# Patient Record
Sex: FEMALE | Race: WHITE | NOT HISPANIC OR LATINO
[De-identification: names, ages, dates, MRNs, and addresses within clinical notes are randomized per-mention and may not be internally consistent; named-entity substitution may affect disease eponyms.]

---

## 2021-07-08 ENCOUNTER — RESULT REVIEW (OUTPATIENT)
Age: 31
End: 2021-07-08

## 2022-02-24 PROBLEM — Z00.00 ENCOUNTER FOR PREVENTIVE HEALTH EXAMINATION: Status: ACTIVE | Noted: 2022-02-24

## 2022-02-28 ENCOUNTER — ASOB RESULT (OUTPATIENT)
Age: 32
End: 2022-02-28

## 2022-02-28 ENCOUNTER — APPOINTMENT (OUTPATIENT)
Dept: ANTEPARTUM | Facility: CLINIC | Age: 32
End: 2022-02-28
Payer: COMMERCIAL

## 2022-02-28 PROCEDURE — 76811 OB US DETAILED SNGL FETUS: CPT

## 2022-04-08 ENCOUNTER — ASOB RESULT (OUTPATIENT)
Age: 32
End: 2022-04-08

## 2022-04-08 ENCOUNTER — APPOINTMENT (OUTPATIENT)
Dept: MATERNAL FETAL MEDICINE | Facility: CLINIC | Age: 32
End: 2022-04-08
Payer: COMMERCIAL

## 2022-04-08 DIAGNOSIS — O24.419 GESTATIONAL DIABETES MELLITUS IN PREGNANCY, UNSPECIFIED CONTROL: ICD-10-CM

## 2022-04-08 PROCEDURE — G0108 DIAB MANAGE TRN  PER INDIV: CPT | Mod: 95

## 2022-04-08 PROCEDURE — G0109 DIAB MANAGE TRN IND/GROUP: CPT | Mod: 95

## 2022-04-11 RX ORDER — BLOOD-GLUCOSE METER
KIT MISCELLANEOUS 4 TIMES DAILY
Qty: 4 | Refills: 2 | Status: ACTIVE | COMMUNITY
Start: 2022-04-08 | End: 1900-01-01

## 2022-04-11 RX ORDER — LANCETS 33 GAUGE
EACH MISCELLANEOUS
Qty: 4 | Refills: 2 | Status: ACTIVE | COMMUNITY
Start: 2022-04-08 | End: 1900-01-01

## 2022-04-11 RX ORDER — URINE ACETONE TEST STRIPS
STRIP MISCELLANEOUS
Qty: 50 | Refills: 2 | Status: ACTIVE | COMMUNITY
Start: 2022-04-08 | End: 1900-01-01

## 2022-04-11 RX ORDER — BLOOD-GLUCOSE METER
W/DEVICE KIT MISCELLANEOUS
Qty: 1 | Refills: 0 | Status: ACTIVE | COMMUNITY
Start: 2022-04-08 | End: 1900-01-01

## 2022-04-15 ENCOUNTER — NON-APPOINTMENT (OUTPATIENT)
Age: 32
End: 2022-04-15

## 2022-04-18 ENCOUNTER — APPOINTMENT (OUTPATIENT)
Dept: MATERNAL FETAL MEDICINE | Facility: CLINIC | Age: 32
End: 2022-04-18
Payer: COMMERCIAL

## 2022-04-18 ENCOUNTER — ASOB RESULT (OUTPATIENT)
Age: 32
End: 2022-04-18

## 2022-04-18 PROCEDURE — G0108 DIAB MANAGE TRN  PER INDIV: CPT | Mod: 95

## 2022-05-09 ENCOUNTER — APPOINTMENT (OUTPATIENT)
Dept: MATERNAL FETAL MEDICINE | Facility: CLINIC | Age: 32
End: 2022-05-09
Payer: COMMERCIAL

## 2022-05-09 ENCOUNTER — ASOB RESULT (OUTPATIENT)
Age: 32
End: 2022-05-09

## 2022-05-09 PROCEDURE — G0108 DIAB MANAGE TRN  PER INDIV: CPT | Mod: 95

## 2022-06-01 ENCOUNTER — ASOB RESULT (OUTPATIENT)
Age: 32
End: 2022-06-01

## 2022-06-01 ENCOUNTER — APPOINTMENT (OUTPATIENT)
Dept: MATERNAL FETAL MEDICINE | Facility: CLINIC | Age: 32
End: 2022-06-01
Payer: COMMERCIAL

## 2022-06-01 PROCEDURE — G0108 DIAB MANAGE TRN  PER INDIV: CPT | Mod: 95

## 2022-06-29 ENCOUNTER — ASOB RESULT (OUTPATIENT)
Age: 32
End: 2022-06-29

## 2022-06-29 ENCOUNTER — APPOINTMENT (OUTPATIENT)
Dept: MATERNAL FETAL MEDICINE | Facility: CLINIC | Age: 32
End: 2022-06-29

## 2022-06-29 PROCEDURE — G0108 DIAB MANAGE TRN  PER INDIV: CPT | Mod: 95

## 2022-07-06 ENCOUNTER — TRANSCRIPTION ENCOUNTER (OUTPATIENT)
Age: 32
End: 2022-07-06

## 2022-07-06 ENCOUNTER — INPATIENT (INPATIENT)
Facility: HOSPITAL | Age: 32
LOS: 1 days | Discharge: HOME | End: 2022-07-08
Attending: OBSTETRICS & GYNECOLOGY | Admitting: OBSTETRICS & GYNECOLOGY
Payer: COMMERCIAL

## 2022-07-06 VITALS — OXYGEN SATURATION: 96 % | HEART RATE: 91 BPM

## 2022-07-06 DIAGNOSIS — Z3A.00 WEEKS OF GESTATION OF PREGNANCY NOT SPECIFIED: ICD-10-CM

## 2022-07-06 DIAGNOSIS — Z34.80 ENCOUNTER FOR SUPERVISION OF OTHER NORMAL PREGNANCY, UNSPECIFIED TRIMESTER: ICD-10-CM

## 2022-07-06 DIAGNOSIS — O26.899 OTHER SPECIFIED PREGNANCY RELATED CONDITIONS, UNSPECIFIED TRIMESTER: ICD-10-CM

## 2022-07-06 LAB
BASOPHILS # BLD AUTO: 0.02 K/UL — SIGNIFICANT CHANGE UP (ref 0–0.2)
BASOPHILS NFR BLD AUTO: 0.2 % — SIGNIFICANT CHANGE UP (ref 0–2)
BLD GP AB SCN SERPL QL: NEGATIVE — SIGNIFICANT CHANGE UP
COVID-19 SPIKE DOMAIN AB INTERP: POSITIVE
COVID-19 SPIKE DOMAIN ANTIBODY RESULT: >250 U/ML — HIGH
EOSINOPHIL # BLD AUTO: 0.06 K/UL — SIGNIFICANT CHANGE UP (ref 0–0.5)
EOSINOPHIL NFR BLD AUTO: 0.5 % — SIGNIFICANT CHANGE UP (ref 0–6)
GLUCOSE BLDC GLUCOMTR-MCNC: 101 MG/DL — HIGH (ref 70–99)
GLUCOSE BLDC GLUCOMTR-MCNC: 85 MG/DL — SIGNIFICANT CHANGE UP (ref 70–99)
HCT VFR BLD CALC: 37.7 % — SIGNIFICANT CHANGE UP (ref 34.5–45)
HGB BLD-MCNC: 12.5 G/DL — SIGNIFICANT CHANGE UP (ref 11.5–15.5)
IMM GRANULOCYTES NFR BLD AUTO: 0.9 % — SIGNIFICANT CHANGE UP (ref 0–1.5)
LYMPHOCYTES # BLD AUTO: 1.91 K/UL — SIGNIFICANT CHANGE UP (ref 1–3.3)
LYMPHOCYTES # BLD AUTO: 16.3 % — SIGNIFICANT CHANGE UP (ref 13–44)
MCHC RBC-ENTMCNC: 28.3 PG — SIGNIFICANT CHANGE UP (ref 27–34)
MCHC RBC-ENTMCNC: 33.2 GM/DL — SIGNIFICANT CHANGE UP (ref 32–36)
MCV RBC AUTO: 85.3 FL — SIGNIFICANT CHANGE UP (ref 80–100)
MONOCYTES # BLD AUTO: 0.61 K/UL — SIGNIFICANT CHANGE UP (ref 0–0.9)
MONOCYTES NFR BLD AUTO: 5.2 % — SIGNIFICANT CHANGE UP (ref 2–14)
NEUTROPHILS # BLD AUTO: 9.01 K/UL — HIGH (ref 1.8–7.4)
NEUTROPHILS NFR BLD AUTO: 76.9 % — SIGNIFICANT CHANGE UP (ref 43–77)
NRBC # BLD: 0 /100 WBCS — SIGNIFICANT CHANGE UP (ref 0–0)
PLATELET # BLD AUTO: 165 K/UL — SIGNIFICANT CHANGE UP (ref 150–400)
RBC # BLD: 4.42 M/UL — SIGNIFICANT CHANGE UP (ref 3.8–5.2)
RBC # FLD: 13.3 % — SIGNIFICANT CHANGE UP (ref 10.3–14.5)
RH IG SCN BLD-IMP: POSITIVE — SIGNIFICANT CHANGE UP
SARS-COV-2 IGG+IGM SERPL QL IA: >250 U/ML — HIGH
SARS-COV-2 IGG+IGM SERPL QL IA: POSITIVE
SARS-COV-2 RNA SPEC QL NAA+PROBE: SIGNIFICANT CHANGE UP
T PALLIDUM AB TITR SER: NEGATIVE — SIGNIFICANT CHANGE UP
WBC # BLD: 11.72 K/UL — HIGH (ref 3.8–10.5)
WBC # FLD AUTO: 11.72 K/UL — HIGH (ref 3.8–10.5)

## 2022-07-06 RX ORDER — DIPHENHYDRAMINE HCL 50 MG
25 CAPSULE ORAL EVERY 6 HOURS
Refills: 0 | Status: DISCONTINUED | OUTPATIENT
Start: 2022-07-06 | End: 2022-07-08

## 2022-07-06 RX ORDER — IBUPROFEN 200 MG
600 TABLET ORAL EVERY 6 HOURS
Refills: 0 | Status: COMPLETED | OUTPATIENT
Start: 2022-07-06 | End: 2023-06-04

## 2022-07-06 RX ORDER — IBUPROFEN 200 MG
600 TABLET ORAL EVERY 6 HOURS
Refills: 0 | Status: DISCONTINUED | OUTPATIENT
Start: 2022-07-06 | End: 2022-07-08

## 2022-07-06 RX ORDER — SIMETHICONE 80 MG/1
80 TABLET, CHEWABLE ORAL EVERY 4 HOURS
Refills: 0 | Status: DISCONTINUED | OUTPATIENT
Start: 2022-07-06 | End: 2022-07-08

## 2022-07-06 RX ORDER — OXYTOCIN 10 UNIT/ML
2 VIAL (ML) INJECTION
Qty: 30 | Refills: 0 | Status: DISCONTINUED | OUTPATIENT
Start: 2022-07-06 | End: 2022-07-08

## 2022-07-06 RX ORDER — LANOLIN
1 OINTMENT (GRAM) TOPICAL EVERY 6 HOURS
Refills: 0 | Status: DISCONTINUED | OUTPATIENT
Start: 2022-07-06 | End: 2022-07-08

## 2022-07-06 RX ORDER — BENZOCAINE 10 %
1 GEL (GRAM) MUCOUS MEMBRANE EVERY 6 HOURS
Refills: 0 | Status: DISCONTINUED | OUTPATIENT
Start: 2022-07-06 | End: 2022-07-08

## 2022-07-06 RX ORDER — CHLORHEXIDINE GLUCONATE 213 G/1000ML
1 SOLUTION TOPICAL ONCE
Refills: 0 | Status: DISCONTINUED | OUTPATIENT
Start: 2022-07-06 | End: 2022-07-06

## 2022-07-06 RX ORDER — KETOROLAC TROMETHAMINE 30 MG/ML
30 SYRINGE (ML) INJECTION ONCE
Refills: 0 | Status: DISCONTINUED | OUTPATIENT
Start: 2022-07-06 | End: 2022-07-06

## 2022-07-06 RX ORDER — SODIUM CHLORIDE 9 MG/ML
3 INJECTION INTRAMUSCULAR; INTRAVENOUS; SUBCUTANEOUS EVERY 8 HOURS
Refills: 0 | Status: DISCONTINUED | OUTPATIENT
Start: 2022-07-06 | End: 2022-07-08

## 2022-07-06 RX ORDER — PRAMOXINE HYDROCHLORIDE 150 MG/15G
1 AEROSOL, FOAM RECTAL EVERY 4 HOURS
Refills: 0 | Status: DISCONTINUED | OUTPATIENT
Start: 2022-07-06 | End: 2022-07-08

## 2022-07-06 RX ORDER — DIBUCAINE 1 %
1 OINTMENT (GRAM) RECTAL EVERY 6 HOURS
Refills: 0 | Status: DISCONTINUED | OUTPATIENT
Start: 2022-07-06 | End: 2022-07-08

## 2022-07-06 RX ORDER — OXYTOCIN 10 UNIT/ML
333.33 VIAL (ML) INJECTION
Qty: 20 | Refills: 0 | Status: COMPLETED | OUTPATIENT
Start: 2022-07-06 | End: 2022-07-06

## 2022-07-06 RX ORDER — TETANUS TOXOID, REDUCED DIPHTHERIA TOXOID AND ACELLULAR PERTUSSIS VACCINE, ADSORBED 5; 2.5; 8; 8; 2.5 [IU]/.5ML; [IU]/.5ML; UG/.5ML; UG/.5ML; UG/.5ML
0.5 SUSPENSION INTRAMUSCULAR ONCE
Refills: 0 | Status: DISCONTINUED | OUTPATIENT
Start: 2022-07-06 | End: 2022-07-08

## 2022-07-06 RX ORDER — OXYCODONE HYDROCHLORIDE 5 MG/1
5 TABLET ORAL
Refills: 0 | Status: DISCONTINUED | OUTPATIENT
Start: 2022-07-06 | End: 2022-07-08

## 2022-07-06 RX ORDER — OXYTOCIN 10 UNIT/ML
333.33 VIAL (ML) INJECTION
Qty: 20 | Refills: 0 | Status: DISCONTINUED | OUTPATIENT
Start: 2022-07-06 | End: 2022-07-08

## 2022-07-06 RX ORDER — ACETAMINOPHEN 500 MG
975 TABLET ORAL
Refills: 0 | Status: DISCONTINUED | OUTPATIENT
Start: 2022-07-06 | End: 2022-07-08

## 2022-07-06 RX ORDER — AER TRAVELER 0.5 G/1
1 SOLUTION RECTAL; TOPICAL EVERY 4 HOURS
Refills: 0 | Status: DISCONTINUED | OUTPATIENT
Start: 2022-07-06 | End: 2022-07-08

## 2022-07-06 RX ORDER — MAGNESIUM HYDROXIDE 400 MG/1
30 TABLET, CHEWABLE ORAL
Refills: 0 | Status: DISCONTINUED | OUTPATIENT
Start: 2022-07-06 | End: 2022-07-08

## 2022-07-06 RX ORDER — SODIUM CHLORIDE 9 MG/ML
1000 INJECTION, SOLUTION INTRAVENOUS
Refills: 0 | Status: DISCONTINUED | OUTPATIENT
Start: 2022-07-06 | End: 2022-07-06

## 2022-07-06 RX ORDER — SODIUM CHLORIDE 9 MG/ML
1000 INJECTION INTRAMUSCULAR; INTRAVENOUS; SUBCUTANEOUS
Refills: 0 | Status: DISCONTINUED | OUTPATIENT
Start: 2022-07-06 | End: 2022-07-06

## 2022-07-06 RX ORDER — CITRIC ACID/SODIUM CITRATE 300-500 MG
15 SOLUTION, ORAL ORAL EVERY 6 HOURS
Refills: 0 | Status: DISCONTINUED | OUTPATIENT
Start: 2022-07-06 | End: 2022-07-06

## 2022-07-06 RX ORDER — HYDROCORTISONE 1 %
1 OINTMENT (GRAM) TOPICAL EVERY 6 HOURS
Refills: 0 | Status: DISCONTINUED | OUTPATIENT
Start: 2022-07-06 | End: 2022-07-08

## 2022-07-06 RX ORDER — OXYCODONE HYDROCHLORIDE 5 MG/1
5 TABLET ORAL ONCE
Refills: 0 | Status: DISCONTINUED | OUTPATIENT
Start: 2022-07-06 | End: 2022-07-08

## 2022-07-06 RX ADMIN — Medication 2 MILLIUNIT(S)/MIN: at 08:00

## 2022-07-06 RX ADMIN — Medication 1000 MILLIUNIT(S)/MIN: at 09:58

## 2022-07-06 RX ADMIN — Medication 30 MILLIGRAM(S): at 12:42

## 2022-07-06 RX ADMIN — Medication 600 MILLIGRAM(S): at 18:33

## 2022-07-06 RX ADMIN — Medication 975 MILLIGRAM(S): at 21:15

## 2022-07-06 RX ADMIN — Medication 600 MILLIGRAM(S): at 19:10

## 2022-07-06 RX ADMIN — Medication 600 MILLIGRAM(S): at 23:42

## 2022-07-06 RX ADMIN — Medication 975 MILLIGRAM(S): at 20:44

## 2022-07-06 RX ADMIN — Medication 1000 MILLIUNIT(S)/MIN: at 11:00

## 2022-07-06 NOTE — DISCHARGE NOTE OB - CARE PROVIDER_API CALL
Pipo Gallardo)  Obstetrics and Gynecology  [address] [facility] [address]  [place] [address]  Phone: [phone]  Fax: [phone]  Follow Up Time:

## 2022-07-06 NOTE — OB PROVIDER LABOR PROGRESS NOTE - NS_SUBJECTIVE/OBJECTIVE_OBGYN_ALL_OB_FT
R2 Labor & Delivery Progress Note     Pt seen & examined at bedside for late deceleration with spontaneous recovery to baseline following repositioning to L lateral side.    T(C): 36.6 (07-06-22 @ 04:38), Max: 36.6 (07-06-22 @ 03:57)  HR: 88 (07-06-22 @ 06:27) (69 - 100)  BP: 95/60 (07-06-22 @ 06:26) (95/60 - 135/92)  RR: 18 (07-06-22 @ 04:38) (17 - 18)  SpO2: 98% (07-06-22 @ 06:27) (92% - 100%)

## 2022-07-06 NOTE — OB RN PATIENT PROFILE - BREAST MILK SUPPORTS STABLE NEWBORN BLOOD SUGAR
Patient was seen 11/27/17 for low back strain and prescribed flexeril #30 x 0.  Pharmacy and RX pending, please sign if approved.     Statement Selected

## 2022-07-06 NOTE — OB RN PATIENT PROFILE - FALL HARM RISK - UNIVERSAL INTERVENTIONS
Bed in lowest position, wheels locked, appropriate side rails in place/Call bell, personal items and telephone in reach/Instruct patient to call for assistance before getting out of bed or chair/Non-slip footwear when patient is out of bed/Agency to call system/Physically safe environment - no spills, clutter or unnecessary equipment/Purposeful Proactive Rounding/Room/bathroom lighting operational, light cord in reach

## 2022-07-06 NOTE — OB RN TRIAGE NOTE - FALL HARM RISK - UNIVERSAL INTERVENTIONS
Bed in lowest position, wheels locked, appropriate side rails in place/Call bell, personal items and telephone in reach/Instruct patient to call for assistance before getting out of bed or chair/Non-slip footwear when patient is out of bed/[place] to call system/Physically safe environment - no spills, clutter or unnecessary equipment/Purposeful Proactive Rounding/Room/bathroom lighting operational, light cord in reach

## 2022-07-06 NOTE — OB PROVIDER H&P - HISTORY OF PRESENT ILLNESS
Admission H&P    Subjective  HPI: 31yoF  gestational age presents for ROM @ 2:30a. +FM. CTXs q4min. -VB. Pt denies any other concerns.    – PNC: GDMA1 GBS-  EFW 2800g by justus.  – OBHx: denies  – GynHx: denies  – PMH: denies  – PSH: denies  – Psych: denies   – Social: denies   – Meds: PNV   – Allergies: NKDA  – Will accept blood transfusions? Yes

## 2022-07-06 NOTE — OB PROVIDER H&P - ASSESSMENT
Assessment  – No prenatal issues. GBS-    Plan  1. Admit to LND. Routine Labs. IVF.  2. IOL w/ oral cytotec  3. Fetus: cat 1 tracing. VTX. EFW 2800g by sono. Continuous EFM. Sono. No concerns.  4. Prenatal issues: GDMA1  5. GBS-  6. Pain: IV pain meds/epidural PRN    Patient discussed with attending physician, Dr. Sami Nino MD PGY1

## 2022-07-06 NOTE — OB PROVIDER LABOR PROGRESS NOTE - ASSESSMENT
Plan: 31y y/o  @ 38w3d in stable condition  - Not yet received PO cytotec  - Improvement in tracing with repositioning  - Continuous EFM, [place]  - Con't IVF    Holly Randhawa MD  PGY-2 Plan: 31y y/o  @ 38w3d in stable condition  - Not yet received PO cytotec  - Improvement in tracing with repositioning  - Continuous EFM, [place]  - Con't IVF    d/w Dr Sami Randhawa MD  PGY-2

## 2022-07-06 NOTE — DISCHARGE NOTE OB - NS AS DC STROKE DX YN
Bedside and Verbal shift change report given to Timothy Cardoso (oncoming nurse) by RAFFAELE Staley RN (offgoing nurse). Report given with SBAR, Kardex, Intake/Output, MAR and Recent Results. no

## 2022-07-06 NOTE — DISCHARGE NOTE OB - NS MD DC FALL RISK RISK
For information on Fall & Injury Prevention, visit: [email or web address] OR  [email or web address] OR  https://www.cdc.gov/steadi/patient.html

## 2022-07-06 NOTE — OB NEONATOLOGY/PEDIATRICIAN DELIVERY SUMMARY - NSPEDSNEONOTESA_OBGYN_ALL_OB_FT
Peds team directed by Dr March, Attending Neonatologist, responded to post delivery Code 100 of a 38.3 wk female infant s/p  w/ CAN x 1.  Mom is 30 yo  O+/PNL (-)/immune/GBS (-) ()/Covid (-).  Unremarkable maternal Hx.  Pregnancy complicated by GDM A1 - diet controlled.  Last FS 85.  SROM @ 0230 - clear fluid reported.  Infant delivered in vertex position w/ CAN x 1.  As per DR JOHNSTON, at delivery there was no spontaneous cry, decreased tone and pallor of infant.  Infant placed on mom's chest x ~ 1 minute, stimulated w/o improvement so Code 100 called and infant placed in warmer.  Peds arrived at ~ 3 minutes of life, infant appeared stunned and pale, decreased tone but cried w/ stim.  Pulse oximeter and temp probe placed.  O2 sats 98% in RA at ~ 5 minutes of life.  Infant placed on CPAP 5/RA due to tachypnea, mild retractions and prolonged capillary refill w/ some improvement noted in respiratory effort and color.  3 vessels in cord.  PE as noted.  Apgars 6/8.  EOS 0.26.  Mom plans to exclusively breastfeed, defers Hep B vaccine, PMD is Dr Doyle.  Infant transferred to the NICU on CPAP 5/RA for further management.

## 2022-07-06 NOTE — DISCHARGE NOTE OB - CARE PLAN
1 Principal Discharge DX:	Spontaneous vaginal delivery  Assessment and plan of treatment:	routine post partum activities;  regular diet;   limited physical and sexual activities for 5-6 weeks;  to office in 5-6 weeks  for 2 hour gtt at 6 weeks   Principal Discharge DX:	Spontaneous vaginal delivery  Assessment and plan of treatment:	routine post partum activities;  regular diet; limited physical and sexual activities for 5-6 weeks;  to office in 5-6 weeks  for 2 hour gtt at 6 weeks

## 2022-07-06 NOTE — OB PROVIDER DELIVERY SUMMARY - NSPROVIDERDELIVERYNOTE_OBGYN_ALL_OB_FT
patient progressed rapidly to fd and had a relatively short second stage.   was performed with epidural anesthesia without episiotomy.  viable female infant was delivered danyel position, can x 1 tight.  cord doubly clamped and cut.  baby then delivered easily.  baby was pale and was not crying much and peds was called.  cord gases were obtained.  placenta delivered spontaneously and uterine exploration was wnl.  bilateral vaginal wall lacerations were noted and Dr Bridges came to retract for the repair which was done uneventfully.  there was a small second degree laceration that was repaired in the usual manner with 00/000 rapide.  All counts were correct after delivery.  Patient tolerated all procedures well.  baby appeared to be stable but was taken to nicu for further care.

## 2022-07-06 NOTE — OB PROVIDER H&P - NSHPPHYSICALEXAM_GEN_ALL_CORE
Objective  – Vital Signs  BP: 128/84  HR: 88  Temp: afebrile  – PE:   CV: RRR  Pulm: breathing comfortably on RA  Abd: gravid, nontender  Extr: moving all extremities with ease   nitrazine positive    – VE: 1/0/-3  – FHT: baseline 145, mod variability, +accels, -decels  – Western: 2qmin  – EFW: 2800g by sono  – Sono: vertex

## 2022-07-06 NOTE — DISCHARGE NOTE OB - PLAN OF CARE
routine post partum activities;  regular diet;   limited physical and sexual activities for 5-6 weeks;  to office in 5-6 weeks  for 2 hour gtt at 6 weeks routine post partum activities;  regular diet; limited physical and sexual activities for 5-6 weeks;  to office in 5-6 weeks  for 2 hour gtt at 6 weeks

## 2022-07-06 NOTE — DISCHARGE NOTE OB - PATIENT PORTAL LINK FT
You can access the FollowMyHealth Patient Portal offered by [facility] by registering at the following website: [email or web address]. By joining [facility]’s FollowMyHealth portal, you will also be able to view your health information using other applications (apps) compatible with our system.

## 2022-07-06 NOTE — OB RN TRIAGE NOTE - NS_PRENATALCARE_OBGYN_ALL_OB
Outpatient Medications Marked as Taking for the 5/31/22 encounter (Refill) with Cristi Morel MD   Medication Sig Dispense Refill   • cloBAZam (ONFI) 10 MG tablet Take 1/2 tab (5 mg) at bedtime for 1 week, then 1 tab (10 mg) at bedtime thereafter. 30 tablet 0          Pharmacy: [place] [id number] [place] [address]    Pharmacy change: No    Patient contacted Pharmacy: No    Out of Medication: No    Ok to leave detailed Message: Yes    Informed caller of refill policy- 48-72 hours: Yes    Comment: has six days left    Please call patient back with any questions or if refill is denied.   Yes

## 2022-07-06 NOTE — DISCHARGE NOTE OB - HOSPITAL COURSE
The  patient had a rapid labor and delivery.  Baby went to nicu as she was pale.  patient did well.  she had a second degree laceration and repair.  she will need a 2 hour gtt at 6 weeks

## 2022-07-06 NOTE — OB RN DELIVERY SUMMARY - NS_SEPSISRSKCALC_OBGYN_ALL_OB_FT
EOS calculated successfully. EOS Risk Factor: 0.5/1000 live births ([facility] national incidence); GA=38w3d; Temp=99.32; ROM=7.4; GBS='Negative'; Antibiotics='No antibiotics or any antibiotics < 2 hrs prior to birth'

## 2022-07-06 NOTE — OB RN DELIVERY SUMMARY - NSSELHIDDEN_OBGYN_ALL_OB_FT
[NS_DeliveryAttending1_OBGYN_ALL_OB_FT:[id number]],[NS_DeliveryRN_OBGYN_ALL_OB_FT:[id number]]

## 2022-07-06 NOTE — OB RN PATIENT PROFILE - NS_MODEOFARRIVAL_OBGYN_ALL_OB

## 2022-07-07 RX ADMIN — Medication 975 MILLIGRAM(S): at 15:48

## 2022-07-07 RX ADMIN — Medication 975 MILLIGRAM(S): at 21:00

## 2022-07-07 RX ADMIN — Medication 975 MILLIGRAM(S): at 21:30

## 2022-07-07 RX ADMIN — Medication 600 MILLIGRAM(S): at 13:00

## 2022-07-07 RX ADMIN — Medication 600 MILLIGRAM(S): at 18:15

## 2022-07-07 RX ADMIN — Medication 600 MILLIGRAM(S): at 12:06

## 2022-07-07 RX ADMIN — Medication 600 MILLIGRAM(S): at 06:36

## 2022-07-07 RX ADMIN — Medication 600 MILLIGRAM(S): at 00:15

## 2022-07-07 RX ADMIN — Medication 975 MILLIGRAM(S): at 03:59

## 2022-07-07 RX ADMIN — Medication 975 MILLIGRAM(S): at 10:15

## 2022-07-07 RX ADMIN — Medication 975 MILLIGRAM(S): at 09:30

## 2022-07-07 RX ADMIN — Medication 975 MILLIGRAM(S): at 03:28

## 2022-07-07 RX ADMIN — Medication 975 MILLIGRAM(S): at 16:30

## 2022-07-07 RX ADMIN — Medication 1 TABLET(S): at 12:05

## 2022-07-07 NOTE — PROGRESS NOTE ADULT - ASSESSMENT
A/P:  31y  P1 PPD#1 s/p      PMHx:  Current Issues:    Increase OOB  Regular diet  PO Pain protocol  Routine Postpartum Care      Anne Brown PA-C

## 2022-07-07 NOTE — PROGRESS NOTE ADULT - ATTENDING COMMENTS
Pt seen on AM rounds  VSS AF  Fundus firm 2 cm -U  ext: no edema  A/P: PPD 1 s/p   -stable-requesting to stay for breastfeeding support  Mirtha Ortiz MD

## 2022-07-08 VITALS
RESPIRATION RATE: 18 BRPM | TEMPERATURE: 98 F | HEART RATE: 75 BPM | SYSTOLIC BLOOD PRESSURE: 119 MMHG | DIASTOLIC BLOOD PRESSURE: 77 MMHG | OXYGEN SATURATION: 96 %

## 2022-07-08 PROCEDURE — 85025 COMPLETE CBC W/AUTO DIFF WBC: CPT

## 2022-07-08 PROCEDURE — 86780 TREPONEMA PALLIDUM: CPT

## 2022-07-08 PROCEDURE — 87635 SARS-COV-2 COVID-19 AMP PRB: CPT

## 2022-07-08 PROCEDURE — G0463: CPT

## 2022-07-08 PROCEDURE — 86850 RBC ANTIBODY SCREEN: CPT

## 2022-07-08 PROCEDURE — 82962 GLUCOSE BLOOD TEST: CPT

## 2022-07-08 PROCEDURE — 86769 SARS-COV-2 COVID-19 ANTIBODY: CPT

## 2022-07-08 PROCEDURE — 59025 FETAL NON-STRESS TEST: CPT

## 2022-07-08 PROCEDURE — 86900 BLOOD TYPING SEROLOGIC ABO: CPT

## 2022-07-08 PROCEDURE — 86901 BLOOD TYPING SEROLOGIC RH(D): CPT

## 2022-07-08 RX ORDER — IBUPROFEN 200 MG
1 TABLET ORAL
Qty: 0 | Refills: 0 | DISCHARGE
Start: 2022-07-08

## 2022-07-08 RX ORDER — ACETAMINOPHEN 500 MG
3 TABLET ORAL
Qty: 0 | Refills: 0 | DISCHARGE
Start: 2022-07-08

## 2022-07-08 RX ADMIN — Medication 975 MILLIGRAM(S): at 09:45

## 2022-07-08 RX ADMIN — Medication 600 MILLIGRAM(S): at 00:28

## 2022-07-08 RX ADMIN — Medication 600 MILLIGRAM(S): at 00:58

## 2022-07-08 RX ADMIN — Medication 975 MILLIGRAM(S): at 09:05

## 2022-07-08 RX ADMIN — Medication 975 MILLIGRAM(S): at 02:34

## 2022-07-08 RX ADMIN — Medication 600 MILLIGRAM(S): at 05:41

## 2022-07-08 RX ADMIN — Medication 975 MILLIGRAM(S): at 03:14

## 2022-07-08 RX ADMIN — Medication 600 MILLIGRAM(S): at 05:11

## 2022-07-08 NOTE — CHART NOTE - NSCHARTNOTEFT_GEN_A_CORE
Patient seen for: nutrition consult for GDM postpartum education prior to discharge    Source: patient, EMR    Patient is a 32yo female PPD#2 s/p   Admission date:   Antepartum course significant for GDMA1   Pt plans on breastfeeding infant    Chart reviewed, events noted. Meds/labs reviewed.    Anthropometrics:  Height: 62 inches  Pre-pregnancy weight: 122 pounds   Weight gain: 42 pounds   Admit/Dosing wt: 164 pounds     Nutrition Diagnosis:   Food and Nutrition Related Knowledge Deficit related to knowledge of post-partum GDM nutrition recommendations as evidenced by first pregnancy, complicated by GDM, now post-partum.   Goal: Pt able to teach back 2 points of nutrition education provided.     Nutrition Intervention:  Post-partum GDM diet education provided to patient and spouse at bedside:   1) Increased risk of developing T2DM with GDM and ways to help prevent development  2) 4-12 week fasting oral glucose tolerance test follow-up as outpatient  3) General healthful diet and physical activity recommendations discussed  4) Increased energy needs with breastfeeding    After-delivery GDM education handout provided. Pt made aware RD remains available.    Monitoring:  No other nutrition risks were identified during this encounter.  RD remains available upon request and will follow up per protocol.    [facility] MS RD CDN [facility] Pager #[phone]

## 2022-07-08 NOTE — PROGRESS NOTE ADULT - SUBJECTIVE AND OBJECTIVE BOX
Postpartum Note- PPD#1    Allergies    No Known Allergies    Intolerances      Blood Type  O  --  Positive  Rubella Immune  RPR negative     S:Patient is a  31y P1 now s/p    Patient w/o complaints, pain is controlled.    Pt is OOB, tolerating PO, passing flatus. Lochia WNL.   Feeding: breastfeeding     O:  Vital Signs Last 24 Hrs  T(C): 36.5 (2022 05:00), Max: 37.4 (2022 09:01)  T(F): 97.7 (2022 05:00), Max: 99.32 (2022 09:01)  HR: 90 (2022 05:00) (75 - 113)  BP: 111/73 (2022 05:00) (109/70 - 131/79)  BP(mean): 100 (2022 12:00) (100 - 100)  RR: 17 (2022 05:00) (17 - 18)  SpO2: 97% (2022 05:00) (85% - 98%)     Gen: NAD  CV: rrr s1s2, CTABL  Abdomen: Soft, nontender, non-distended, fundus firm.  Lochia: WNL  Perineum: normal appearing, no active bleeding   Ext: Neg edema, Neg calf tenderness.  Pedal pulses palpated B/L    LABS:    Hemoglobin: 12.5 g/dL ( @ 06:16)      Hematocrit: 37.7 % ( @ 06:16)            
OB Attending Note    S: Patient doing well. Minimal lochia. Pain controlled.    O: Vital Signs Last 24 Hrs  T(C): 36.7 (2022 04:49), Max: 36.7 (2022 17:45)  T(F): 98 (2022 04:49), Max: 98.1 (2022 17:45)  HR: 75 (2022 04:49) (73 - 75)  BP: 119/77 (2022 04:49) (119/77 - 128/83)  BP(mean): --  RR: 18 (2022 04:49) (18 - 18)  SpO2: 96% (2022 04:49) (96% - 98%)    Parameters below as of 2022 04:49  Patient On (Oxygen Delivery Method): room air        Gen: NAD  Abd: soft, NT, ND, fundus firm below umbilicus  Lochia: min  Perineum healing well  Ext: no tenderness          A: 31y PPD2# s/p  doing well.    Plan: cont PP care  - ambulating, tolerating po, breastfeeding with out issue.   - declined tdap and MMR vaccine postpartum. risk and benefits reviewed and continued to decline.     -f/u postpartum

## 2022-07-13 ENCOUNTER — NON-APPOINTMENT (OUTPATIENT)
Age: 32
End: 2022-07-13

## 2022-08-25 ENCOUNTER — RESULT REVIEW (OUTPATIENT)
Age: 32
End: 2022-08-25

## 2024-05-23 NOTE — OB NEONATOLOGY/PEDIATRICIAN DELIVERY SUMMARY - BABY A: DATE/TIME OF DELIVERY
Requested medication(s) are due for refill today: Yes  Patient has already received a courtesy refill: No  Other reason request has been forwarded to provider:    06-Jul-2022 09:54

## 2025-02-10 ENCOUNTER — ASOB RESULT (OUTPATIENT)
Age: 35
End: 2025-02-10

## 2025-02-10 ENCOUNTER — APPOINTMENT (OUTPATIENT)
Dept: ANTEPARTUM | Facility: CLINIC | Age: 35
End: 2025-02-10

## 2025-02-10 PROCEDURE — 76811 OB US DETAILED SNGL FETUS: CPT

## 2025-06-24 ENCOUNTER — TRANSCRIPTION ENCOUNTER (OUTPATIENT)
Age: 35
End: 2025-06-24

## 2025-06-24 ENCOUNTER — APPOINTMENT (OUTPATIENT)
Dept: ANTEPARTUM | Facility: HOSPITAL | Age: 35
End: 2025-06-24

## 2025-06-24 ENCOUNTER — INPATIENT (INPATIENT)
Facility: HOSPITAL | Age: 35
LOS: 1 days | Discharge: HOME | DRG: 951 | End: 2025-06-26
Attending: OBSTETRICS & GYNECOLOGY | Admitting: OBSTETRICS & GYNECOLOGY
Payer: COMMERCIAL

## 2025-06-24 VITALS
DIASTOLIC BLOOD PRESSURE: 75 MMHG | RESPIRATION RATE: 16 BRPM | SYSTOLIC BLOOD PRESSURE: 114 MMHG | HEART RATE: 86 BPM | TEMPERATURE: 98 F

## 2025-06-24 DIAGNOSIS — O26.899 OTHER SPECIFIED PREGNANCY RELATED CONDITIONS, UNSPECIFIED TRIMESTER: ICD-10-CM

## 2025-06-24 DIAGNOSIS — K08.409 PARTIAL LOSS OF TEETH, UNSPECIFIED CAUSE, UNSPECIFIED CLASS: Chronic | ICD-10-CM

## 2025-06-24 DIAGNOSIS — Z34.90 ENCOUNTER FOR SUPERVISION OF NORMAL PREGNANCY, UNSPECIFIED, UNSPECIFIED TRIMESTER: ICD-10-CM

## 2025-06-24 DIAGNOSIS — Z34.80 ENCOUNTER FOR SUPERVISION OF OTHER NORMAL PREGNANCY, UNSPECIFIED TRIMESTER: ICD-10-CM

## 2025-06-24 LAB
BASOPHILS # BLD AUTO: 0.03 K/UL — SIGNIFICANT CHANGE UP (ref 0–0.2)
BASOPHILS NFR BLD AUTO: 0.3 % — SIGNIFICANT CHANGE UP (ref 0–2)
BLD GP AB SCN SERPL QL: NEGATIVE — SIGNIFICANT CHANGE UP
EOSINOPHIL # BLD AUTO: 0.06 K/UL — SIGNIFICANT CHANGE UP (ref 0–0.5)
EOSINOPHIL NFR BLD AUTO: 0.5 % — SIGNIFICANT CHANGE UP (ref 0–6)
GLUCOSE BLDC GLUCOMTR-MCNC: 103 MG/DL — HIGH (ref 70–99)
HCT VFR BLD CALC: 38.3 % — SIGNIFICANT CHANGE UP (ref 34.5–45)
HGB BLD-MCNC: 12.4 G/DL — SIGNIFICANT CHANGE UP (ref 11.5–15.5)
HIV 1+2 AB+HIV1 P24 AG SERPL QL IA: SIGNIFICANT CHANGE UP
IMM GRANULOCYTES # BLD AUTO: 0.07 K/UL — SIGNIFICANT CHANGE UP (ref 0–0.07)
IMM GRANULOCYTES NFR BLD AUTO: 0.6 % — SIGNIFICANT CHANGE UP (ref 0–0.9)
LYMPHOCYTES # BLD AUTO: 2.05 K/UL — SIGNIFICANT CHANGE UP (ref 1–3.3)
LYMPHOCYTES NFR BLD AUTO: 17.1 % — SIGNIFICANT CHANGE UP (ref 13–44)
MCHC RBC-ENTMCNC: 27.7 PG — SIGNIFICANT CHANGE UP (ref 27–34)
MCHC RBC-ENTMCNC: 32.4 G/DL — SIGNIFICANT CHANGE UP (ref 32–36)
MCV RBC AUTO: 85.5 FL — SIGNIFICANT CHANGE UP (ref 80–100)
MONOCYTES # BLD AUTO: 0.55 K/UL — SIGNIFICANT CHANGE UP (ref 0–0.9)
MONOCYTES NFR BLD AUTO: 4.6 % — SIGNIFICANT CHANGE UP (ref 2–14)
NEUTROPHILS # BLD AUTO: 9.22 K/UL — HIGH (ref 1.8–7.4)
NEUTROPHILS NFR BLD AUTO: 76.9 % — SIGNIFICANT CHANGE UP (ref 43–77)
NRBC # BLD AUTO: 0 K/UL — SIGNIFICANT CHANGE UP (ref 0–0)
NRBC # FLD: 0 K/UL — SIGNIFICANT CHANGE UP (ref 0–0)
NRBC BLD AUTO-RTO: 0 /100 WBCS — SIGNIFICANT CHANGE UP (ref 0–0)
PLATELET # BLD AUTO: 180 K/UL — SIGNIFICANT CHANGE UP (ref 150–400)
PMV BLD: 12.6 FL — SIGNIFICANT CHANGE UP (ref 7–13)
RBC # BLD: 4.48 M/UL — SIGNIFICANT CHANGE UP (ref 3.8–5.2)
RBC # FLD: 13.9 % — SIGNIFICANT CHANGE UP (ref 10.3–14.5)
RH IG SCN BLD-IMP: POSITIVE — SIGNIFICANT CHANGE UP
T PALLIDUM AB TITR SER: NEGATIVE — SIGNIFICANT CHANGE UP
WBC # BLD: 11.98 K/UL — HIGH (ref 3.8–10.5)
WBC # FLD AUTO: 11.98 K/UL — HIGH (ref 3.8–10.5)

## 2025-06-24 PROCEDURE — 59050 FETAL MONITOR W/REPORT: CPT

## 2025-06-24 PROCEDURE — 86850 RBC ANTIBODY SCREEN: CPT

## 2025-06-24 PROCEDURE — 85025 COMPLETE CBC W/AUTO DIFF WBC: CPT

## 2025-06-24 PROCEDURE — 82962 GLUCOSE BLOOD TEST: CPT

## 2025-06-24 PROCEDURE — 86901 BLOOD TYPING SEROLOGIC RH(D): CPT

## 2025-06-24 PROCEDURE — 87389 HIV-1 AG W/HIV-1&-2 AB AG IA: CPT

## 2025-06-24 PROCEDURE — 86900 BLOOD TYPING SEROLOGIC ABO: CPT

## 2025-06-24 PROCEDURE — 86780 TREPONEMA PALLIDUM: CPT

## 2025-06-24 RX ORDER — WITCH HAZEL LEAF
1 FLUID EXTRACT MISCELLANEOUS EVERY 4 HOURS
Refills: 0 | Status: DISCONTINUED | OUTPATIENT
Start: 2025-06-24 | End: 2025-06-26

## 2025-06-24 RX ORDER — MODIFIED LANOLIN 100 %
1 CREAM (GRAM) TOPICAL EVERY 6 HOURS
Refills: 0 | Status: DISCONTINUED | OUTPATIENT
Start: 2025-06-24 | End: 2025-06-26

## 2025-06-24 RX ORDER — IBUPROFEN 200 MG
600 TABLET ORAL EVERY 6 HOURS
Refills: 0 | Status: COMPLETED | OUTPATIENT
Start: 2025-06-24 | End: 2026-05-23

## 2025-06-24 RX ORDER — OXYTOCIN-SODIUM CHLORIDE 0.9% IV SOLN 30 UNIT/500ML 30-0.9/5 UT/ML-%
167 SOLUTION INTRAVENOUS
Qty: 30 | Refills: 0 | Status: DISCONTINUED | OUTPATIENT
Start: 2025-06-24 | End: 2025-06-26

## 2025-06-24 RX ORDER — SODIUM CHLORIDE 9 G/1000ML
1000 INJECTION, SOLUTION INTRAVENOUS
Refills: 0 | Status: DISCONTINUED | OUTPATIENT
Start: 2025-06-24 | End: 2025-06-25

## 2025-06-24 RX ORDER — AMPICILLIN SODIUM 1 G/1
2 INJECTION, POWDER, FOR SOLUTION INTRAMUSCULAR; INTRAVENOUS ONCE
Refills: 0 | Status: COMPLETED | OUTPATIENT
Start: 2025-06-24 | End: 2025-06-24

## 2025-06-24 RX ORDER — DIPHENHYDRAMINE HCL 12.5MG/5ML
25 ELIXIR ORAL EVERY 6 HOURS
Refills: 0 | Status: DISCONTINUED | OUTPATIENT
Start: 2025-06-24 | End: 2025-06-26

## 2025-06-24 RX ORDER — SODIUM CHLORIDE 9 G/1000ML
500 INJECTION, SOLUTION INTRAVENOUS ONCE
Refills: 0 | Status: COMPLETED | OUTPATIENT
Start: 2025-06-24 | End: 2025-06-24

## 2025-06-24 RX ORDER — KETOROLAC TROMETHAMINE 30 MG/ML
30 INJECTION, SOLUTION INTRAMUSCULAR; INTRAVENOUS ONCE
Refills: 0 | Status: DISCONTINUED | OUTPATIENT
Start: 2025-06-24 | End: 2025-06-24

## 2025-06-24 RX ORDER — MAGNESIUM HYDROXIDE 400 MG/5ML
30 SUSPENSION ORAL
Refills: 0 | Status: DISCONTINUED | OUTPATIENT
Start: 2025-06-24 | End: 2025-06-26

## 2025-06-24 RX ORDER — OXYCODONE HYDROCHLORIDE 30 MG/1
5 TABLET ORAL ONCE
Refills: 0 | Status: DISCONTINUED | OUTPATIENT
Start: 2025-06-24 | End: 2025-06-26

## 2025-06-24 RX ORDER — ACETAMINOPHEN 500 MG/5ML
975 LIQUID (ML) ORAL
Refills: 0 | Status: DISCONTINUED | OUTPATIENT
Start: 2025-06-24 | End: 2025-06-26

## 2025-06-24 RX ORDER — OXYCODONE HYDROCHLORIDE 30 MG/1
5 TABLET ORAL
Refills: 0 | Status: DISCONTINUED | OUTPATIENT
Start: 2025-06-24 | End: 2025-06-26

## 2025-06-24 RX ORDER — BENZOCAINE 220 MG/G
1 SPRAY, METERED PERIODONTAL EVERY 6 HOURS
Refills: 0 | Status: DISCONTINUED | OUTPATIENT
Start: 2025-06-24 | End: 2025-06-26

## 2025-06-24 RX ORDER — PRENATAL 136/IRON/FOLIC ACID 27 MG-1 MG
1 TABLET ORAL DAILY
Refills: 0 | Status: DISCONTINUED | OUTPATIENT
Start: 2025-06-24 | End: 2025-06-26

## 2025-06-24 RX ORDER — OXYTOCIN-SODIUM CHLORIDE 0.9% IV SOLN 30 UNIT/500ML 30-0.9/5 UT/ML-%
4 SOLUTION INTRAVENOUS
Qty: 30 | Refills: 0 | Status: DISCONTINUED | OUTPATIENT
Start: 2025-06-24 | End: 2025-06-26

## 2025-06-24 RX ORDER — HYDROCORTISONE 10 MG/G
1 CREAM TOPICAL EVERY 6 HOURS
Refills: 0 | Status: DISCONTINUED | OUTPATIENT
Start: 2025-06-24 | End: 2025-06-26

## 2025-06-24 RX ORDER — PRAMOXINE HCL 1 %
1 GEL (GRAM) TOPICAL EVERY 4 HOURS
Refills: 0 | Status: DISCONTINUED | OUTPATIENT
Start: 2025-06-24 | End: 2025-06-26

## 2025-06-24 RX ORDER — DIBUCAINE 10 MG/G
1 OINTMENT TOPICAL EVERY 6 HOURS
Refills: 0 | Status: DISCONTINUED | OUTPATIENT
Start: 2025-06-24 | End: 2025-06-26

## 2025-06-24 RX ORDER — SIMETHICONE 80 MG
80 TABLET,CHEWABLE ORAL EVERY 4 HOURS
Refills: 0 | Status: DISCONTINUED | OUTPATIENT
Start: 2025-06-24 | End: 2025-06-26

## 2025-06-24 RX ORDER — AMPICILLIN SODIUM 1 G/1
1 INJECTION, POWDER, FOR SOLUTION INTRAMUSCULAR; INTRAVENOUS EVERY 4 HOURS
Refills: 0 | Status: DISCONTINUED | OUTPATIENT
Start: 2025-06-24 | End: 2025-06-25

## 2025-06-24 RX ORDER — CITRIC ACID/SODIUM CITRATE 300-500 MG
15 SOLUTION, ORAL ORAL EVERY 6 HOURS
Refills: 0 | Status: DISCONTINUED | OUTPATIENT
Start: 2025-06-24 | End: 2025-06-25

## 2025-06-24 RX ADMIN — SODIUM CHLORIDE 1000 MILLILITER(S): 9 INJECTION, SOLUTION INTRAVENOUS at 14:17

## 2025-06-24 RX ADMIN — SODIUM CHLORIDE 125 MILLILITER(S): 9 INJECTION, SOLUTION INTRAVENOUS at 12:10

## 2025-06-24 RX ADMIN — SODIUM CHLORIDE 125 MILLILITER(S): 9 INJECTION, SOLUTION INTRAVENOUS at 12:14

## 2025-06-24 RX ADMIN — AMPICILLIN SODIUM 100 GRAM(S): 1 INJECTION, POWDER, FOR SOLUTION INTRAMUSCULAR; INTRAVENOUS at 16:21

## 2025-06-24 RX ADMIN — OXYTOCIN-SODIUM CHLORIDE 0.9% IV SOLN 30 UNIT/500ML 4 MILLIUNIT(S)/MIN: 30-0.9/5 SOLUTION at 15:12

## 2025-06-24 RX ADMIN — KETOROLAC TROMETHAMINE 30 MILLIGRAM(S): 30 INJECTION, SOLUTION INTRAMUSCULAR; INTRAVENOUS at 23:04

## 2025-06-24 RX ADMIN — AMPICILLIN SODIUM 200 GRAM(S): 1 INJECTION, POWDER, FOR SOLUTION INTRAMUSCULAR; INTRAVENOUS at 12:15

## 2025-06-24 RX ADMIN — Medication 1 APPLICATION(S): at 12:37

## 2025-06-24 RX ADMIN — AMPICILLIN SODIUM 100 GRAM(S): 1 INJECTION, POWDER, FOR SOLUTION INTRAMUSCULAR; INTRAVENOUS at 20:02

## 2025-06-24 NOTE — DISCHARGE NOTE OB - FINANCIAL ASSISTANCE
[facility] provides services at a reduced cost to those who are determined to be eligible through [facility]’s financial assistance program. Information regarding [facility]’s financial assistance program can be found by going to [email or web address] or by calling [phone].

## 2025-06-24 NOTE — OB RN DELIVERY SUMMARY - NSSELHIDDEN_OBGYN_ALL_OB_FT
[NS_DeliveryAttending1_OBGYN_ALL_OB_FT:[id number]],[NS_DeliveryAssist1_OBGYN_ALL_OB_FT:[email or web address]=],[NS_DeliveryRN_OBGYN_ALL_OB_FT:[id number]=]

## 2025-06-24 NOTE — OB PROVIDER H&P - NS_PRENATALMEDS_OBGYN_A_OB
Pt called in stating that incision site is numb advised pt of her apt to see Gwen on 1/15 @2:30.   
Prenatal Vitamins

## 2025-06-24 NOTE — PRE-ANESTHESIA EVALUATION ADULT - HEIGHT IN INCHES
Detail Level: Zone Recommendation Preamble: The following recommendations were made during the visit: Recommendations (Free Text): Mole within patient’s tattoo, he will monitor for change 2

## 2025-06-24 NOTE — OB RN DELIVERY SUMMARY - NS_SEPSISRSKCALC_OBGYN_ALL_OB_FT
EOS calculated successfully. EOS Risk Factor: 0.5/1000 live births ([facility] national incidence); GA=39w1d; Temp=98.96; ROM=4.133; GBS='Positive'; Antibiotics='GBS specific antibiotics > 2 hrs prior to birth'

## 2025-06-24 NOTE — OB PROVIDER DELIVERY SUMMARY - NSPROVIDERDELIVERYNOTE_OBGYN_ALL_OB_FT
Patient fully dilated and pushing.  of liveborn male infant. Head, shoulders and body delivered easily in JOEL position. Delayed cord clamping 60s. Cord clamped and cut, infant to mother. Placenta delivered intact. Bimanual exam performed, with minimal clot evacuated. Fundus and lower uterine segment firm. Excellent hemostasis. Vaginal exam revealed intact cervix, vaginal walls, sulci. Second degree and bilateral labial lacerations identified and repaired in usual fashion with 2.0 and 3.0 vicryl rapide suture. Count was correct x2. EBL 350cc.    Laxmi Epps, PGY-1  With Dr. Ortiz, attending

## 2025-06-24 NOTE — OB PROVIDER H&P - ASSESSMENT
34 year old  at 39.1 weeks in labor, with h/o precipitous delivery; +GBS    -Admit to L and D  -Routine labs  -NPO/IVF  -Bictra  -EFM/toco  -Anesthesia consult  -Ampicillin for GBS prophylaxis  -AROM/Pitocin as needed  -Anticipate     d/w Md Diana Dixon FNP-BC

## 2025-06-24 NOTE — OB PROVIDER LABOR PROGRESS NOTE - NS_SUBJECTIVE/OBJECTIVE_OBGYN_ALL_OB_FT
S:  Pt seen and examined for increased pressure.  O:  Vital Signs Last 24 Hrs  T(C): 36.6 (24 Jun 2025 19:28), Max: 37.2 (24 Jun 2025 18:53)  T(F): 97.88 (24 Jun 2025 19:28), Max: 98.96 (24 Jun 2025 18:53)  HR: 89 (24 Jun 2025 20:14) (33 - 100)  BP: 120/77 (24 Jun 2025 19:51) (88/49 - 135/81)  BP(mean): --  RR: 20 (24 Jun 2025 18:53) (16 - 20)  SpO2: 93% (24 Jun 2025 20:14) (91% - 100%)    Parameters below as of 24 Jun 2025 12:41  Patient On (Oxygen Delivery Method): room air
Pt seen and examined at bedside.    Vital Signs Last 24 Hrs  T(C): 36.9 (24 Jun 2025 15:04), Max: 36.9 (24 Jun 2025 15:04)  T(F): 98.42 (24 Jun 2025 15:04), Max: 98.42 (24 Jun 2025 15:04)  HR: 77 (24 Jun 2025 16:53) (33 - 100)  BP: 99/61 (24 Jun 2025 16:51) (88/49 - 135/81)  BP(mean): --  RR: 20 (24 Jun 2025 15:04) (16 - 20)  SpO2: 96% (24 Jun 2025 16:53) (91% - 100%)    Parameters below as of 24 Jun 2025 12:41  Patient On (Oxygen Delivery Method): room air
R3 Labor & Delivery Progress Note     Pt seen & examined at bedside for vaginal exam.     T(C): 36.6 (06-24-25 @ 19:28), Max: 37.2 (06-24-25 @ 18:53)  HR: 86 (06-24-25 @ 19:54) (33 - 100)  BP: 120/77 (06-24-25 @ 19:51) (88/49 - 135/81)  RR: 20 (06-24-25 @ 18:53) (16 - 20)  SpO2: 94% (06-24-25 @ 19:54) (91% - 100%)

## 2025-06-24 NOTE — OB PROVIDER DELIVERY SUMMARY - NSSELHIDDEN_OBGYN_ALL_OB_FT
[NS_DeliveryAttending1_OBGYN_ALL_OB_FT:[id number]],[NS_DeliveryAssist1_OBGYN_ALL_OB_FT:[email or web address]=],[NS_DeliveryRN_OBGYN_ALL_OB_FT:[id number]=]

## 2025-06-24 NOTE — OB PROVIDER H&P - ATTENDING COMMENTS
34 year old  at 39.1 weeks presents with contractions q5min all morning, desiring pain medication with contractions. +FM. -LOF. +CTXs. +VB (light, pink spotting). Uncomplicated pregnancy. +GBS    PNC uncomplicated    VE: 380/-3  toco: ctx irregular  EFM: , s/p 4 min decels with an exam an hour ago  good recovery-likely vasovagal syncopy as her BP dropped aand decel happened with her exam    HIV/RPR/Hep b and C neg  + GBS  A/P: Labor augmentation at term  -pitocin  -arom with next exam  -epidural working well for pain  Mirtha Ortiz MD

## 2025-06-24 NOTE — OB PROVIDER H&P - NS_OBGYNHISTORY_OBGYN_ALL_OB_FT
2022 38.3 weeks  girl [id number] CANx1 GDM diet controlled  nausea beginning of pregnancy no meds just PNV  HPV early pregnancy will followup next year.  Pt states she has hx of fainting if she sees too much blood but none during previous or current pregnancy

## 2025-06-24 NOTE — CHART NOTE - NSCHARTNOTEFT_GEN_A_CORE
Cervical exam by Lisa Dixon, unchanged.     Patient then reported feeling dizzy, HR in 40s then 30s, BP cycled was 80s/40s. Patient lost consciousness for 3 seconds then came back to consciousness. Code called simultaneously. FHR deceleration to 90s for 5 minutes. Anesthesia came to bedside and pushed ephedrine. Dr. Ortiz called and arrived to bedside. Patients vital signs stabilized then FHR stabilized back to baseline 130 with moderate variability.     Continue to monitor patient and FHR closely, will monitor expectantly for now and reassess to start pitocin.     Nicol Olivarez, PGY2  with Dr. Reid, DEVAN Dixon, Dr. Ortiz

## 2025-06-24 NOTE — DISCHARGE NOTE OB - CARE PROVIDER_API CALL
Mirtha Ortiz  Obstetrics & Gynecology  [address], [address]  [place] [address]  Phone: [phone]  Fax: [phone]  Follow Up Time:

## 2025-06-24 NOTE — OB PROVIDER H&P - HISTORY OF PRESENT ILLNESS
34 year old  at 39.1 weeks presents with contractions q5min all morning, desiring pain medication with contractions. +FM. -LOF. +CTXs. +VB (light, pink spotting). Uncomplicated pregnancy. +GBS    OBHx: FT  - pregnancy complicated by GDMA1  GynHx: h/o +HPV-negative after retesting; denies cysts, polyps, fibroids, PCOS, endometriosis, STIs  MedHx: denies  SurgHx: wisdom teeth removal  PsychHx: denies anxiety, depression, PPD  SocialHx: denies ETOH, tobacco, drug use  Meds: PNV   All: NKDA, NKFA, NKEA    Vital Signs Last 24 Hrs  T(C): 36.8 (2025 10:48), Max: 36.8 (2025 10:39)  T(F): 98.24 (2025 10:48), Max: 98.24 (2025 10:48)  HR: 92 (2025 12:06) (86 - 100)  BP: 114/75 (2025 10:48) (114/75 - 114/75)  BP(mean): --  RR: 16 (2025 10:48) (16 - 16)  SpO2: 91% (2025 12:06) (91% - 96%)    PE: NAD, AOx3, abdomen soft gravid  VE: 3.0/80/-3  EFM: 130, moderate variability, +accels, -decels  [place]: 3-6min  EFW: 3500 grams  Sono: cephalic

## 2025-06-24 NOTE — OB RN TRIAGE NOTE - NS_OBGYNHISTORY_OBGYN_ALL_OB_FT
2022 38.3 weeks  girl [id number] CANx1 GDM diet controlled  nausea beginning of pregnancy no meds just PNV  HPV early pregnancy will followup next year. 2022 38.3 weeks  girl [id number] CANx1 GDM diet controlled  nausea beginning of pregnancy no meds just PNV  HPV early pregnancy will followup next year.  Pt states she has hx of fainting if she sees too much blood but none during previous or current pregnancy

## 2025-06-24 NOTE — OB PROVIDER TRIAGE NOTE - NS_OBGYNHISTORY_OBGYN_ALL_OB_FT
2022 38.3 weeks  girl [id number] CANx1 GDM diet controlled  nausea beginning of pregnancy no meds just PNV  HPV early pregnancy will followup next year.  Pt states she has hx of fainting if she sees too much blood but none during previous or current pregnancy

## 2025-06-24 NOTE — DISCHARGE NOTE OB - PATIENT PORTAL LINK FT
You can access the FollowMyHealth Patient Portal offered by [facility] by registering at the following website: [email or web address]. By joining [facility]’s FollowMyHealth portal, you will also be able to view your health information using other applications (apps) compatible with our system.

## 2025-06-24 NOTE — OB PROVIDER H&P - NSLOWPPHRISK_OBGYN_A_OB
No previous uterine incision/[place] Pregnancy/Less than or equal to 4 previous vaginal births/No known bleeding disorder/No history of postpartum hemorrhage/No other PPH risks indicated

## 2025-06-24 NOTE — OB PROVIDER LABOR PROGRESS NOTE - ASSESSMENT
Plan: 35yo  @ 39w1d  - Con't pitocin  - Continuous EFM, [place]  - Con't IVF       Satish Aragon MD  PGY-3
P1 in early labor    -Labor: pt making cervical change, c/w pitocin, AROM at time of VE clear fluid  -Fetus: cat 1  -GBS: pos on Amp  -Pain: epidural in place, effective    D/w Dr. Diana Lindquist PGY3
Pt is a 35yo  @39.1w admitted for labor.    - Continue pitocin  - S/p AROM  - Continue cont EFM, toco, IVF  - Epidural in place  - Anticipate     D/w Dr. Ortiz, attending  Laxmi Epps MD PGY1

## 2025-06-24 NOTE — OB RN PATIENT PROFILE - CURRENT PREGNANCY COMPLICATIONS, OB PROFILE
[Excellent] : ~his/her~  mood as  excellent [No falls in past year] : Patient reported no falls in the past year [0] : 2) Feeling down, depressed, or hopeless: Not at all (0) [Alone] : lives alone [] :  [Reports changes in hearing] : Reports changes in hearing [Seat Belt] :  uses seat belt [Aggressive treatment] : aggressive treatment [] : No [Change in mental status noted] : No change in mental status noted [MammogramDate] : 2018 [ColonoscopyDate] : 2016 [de-identified] : Hearing aids Maternal Positive GBS

## 2025-06-24 NOTE — OB PROVIDER H&P - PROBLEM SELECTOR PLAN 1
34 year old  at 39.1 weeks in labor, with h/o precipitous delivery; +GBS    -Admit to L and D  -Routine labs  -NPO/IVF  -Bictra  -EFM/toco  -Anesthesia consult  -Ampicillin for GBS prophylaxis  -AROM/Pitocin as needed  -Anticipate

## 2025-06-24 NOTE — OB PROVIDER TRIAGE NOTE - HISTORY OF PRESENT ILLNESS
R1 Triage Note    Subjective  HPI: 34 yoF  at 39&1 GBS+ presents for rule out labor. +FM. -LOF. +CTXs. +VB (light, pink spotting). Pt denies any other concerns.    – PNC: Denies prenatal issues. GBS +.  EFW 3500g by sono.  – OBHx: full term vaginal delivery 2022- pregnancy complicated by gestational diabetes managed with diet & exercise  – GynHx: denies cysts, polyps, fibroids, PCOS, endometriosis, STIs, papsmear during pregnancy HPV+, but normal after retesting  – PMH: denies  – PSH: wisdom teeth removal  – Psych: denies   – Social: denies   – Meds: PNV   – Allergies: NKDA    Objective  – Vital Signs  BP:   HR:   Temp:   – PE:   CV: RRR  Pulm: breathing comfortably on RA  Abd: gravid, nontender  Extr: moving all extremities with ease  – FS:   – Spec: pooling, nitrazine, ferning, bleeding,  (lesions if patient with HSV2 history)  – VE: //  – FHT: baseline 1, mod variability, +accels, -decels  – [place]: qmin  – EFW: _g by sono  – Sono: vertex    Assessment  No prenatal issues. GBS +.    Plan  –    Patient discussed with attending physician,  .

## 2025-06-24 NOTE — OB RN PATIENT PROFILE - NS_OBGYNHISTORY_OBGYN_ALL_OB_FT
2022 38.3 weeks  girl [id number] CANx1 GDM diet controlled  nausea beginning of pregnancy no meds just PNV  HPV early pregnancy will followup next year.  Pt states she has hx of fainting if she sees too much blood but none during previous or current pregnancy

## 2025-06-24 NOTE — DISCHARGE NOTE OB - CARE PLAN
1 Principal Discharge DX:	Vaginal delivery  Assessment and plan of treatment:	Pt had a vaginal delivery and was discharged home ppd

## 2025-06-25 PROCEDURE — 86901 BLOOD TYPING SEROLOGIC RH(D): CPT

## 2025-06-25 PROCEDURE — 86900 BLOOD TYPING SEROLOGIC ABO: CPT

## 2025-06-25 PROCEDURE — 86780 TREPONEMA PALLIDUM: CPT

## 2025-06-25 PROCEDURE — 87389 HIV-1 AG W/HIV-1&-2 AB AG IA: CPT

## 2025-06-25 PROCEDURE — 82962 GLUCOSE BLOOD TEST: CPT

## 2025-06-25 PROCEDURE — 59050 FETAL MONITOR W/REPORT: CPT

## 2025-06-25 PROCEDURE — 85025 COMPLETE CBC W/AUTO DIFF WBC: CPT

## 2025-06-25 PROCEDURE — 86850 RBC ANTIBODY SCREEN: CPT

## 2025-06-25 RX ORDER — IBUPROFEN 200 MG
600 TABLET ORAL EVERY 6 HOURS
Refills: 0 | Status: DISCONTINUED | OUTPATIENT
Start: 2025-06-25 | End: 2025-06-26

## 2025-06-25 RX ADMIN — Medication 975 MILLIGRAM(S): at 04:00

## 2025-06-25 RX ADMIN — Medication 975 MILLIGRAM(S): at 15:15

## 2025-06-25 RX ADMIN — Medication 600 MILLIGRAM(S): at 12:45

## 2025-06-25 RX ADMIN — Medication 975 MILLIGRAM(S): at 10:03

## 2025-06-25 RX ADMIN — Medication 600 MILLIGRAM(S): at 11:54

## 2025-06-25 RX ADMIN — Medication 600 MILLIGRAM(S): at 06:46

## 2025-06-25 RX ADMIN — Medication 975 MILLIGRAM(S): at 03:06

## 2025-06-25 RX ADMIN — Medication 600 MILLIGRAM(S): at 05:42

## 2025-06-25 RX ADMIN — Medication 600 MILLIGRAM(S): at 23:57

## 2025-06-25 RX ADMIN — Medication 975 MILLIGRAM(S): at 21:01

## 2025-06-25 RX ADMIN — Medication 1 TABLET(S): at 11:54

## 2025-06-25 RX ADMIN — Medication 975 MILLIGRAM(S): at 16:00

## 2025-06-25 RX ADMIN — Medication 600 MILLIGRAM(S): at 17:41

## 2025-06-25 RX ADMIN — Medication 975 MILLIGRAM(S): at 22:00

## 2025-06-25 RX ADMIN — Medication 975 MILLIGRAM(S): at 08:08

## 2025-06-25 NOTE — PROGRESS NOTE ADULT - ASSESSMENT
A/P: 35yo PPD#1 s/p .  Patient is stable and doing well post-partum. VSS, AF.    #Routine postpartum care  - Pain well controlled, continue current pain regimen  - Increase ambulation, SCDs when not ambulating  - Continue regular diet    Laxmi Epps PGY1

## 2025-06-26 VITALS
TEMPERATURE: 98 F | SYSTOLIC BLOOD PRESSURE: 111 MMHG | DIASTOLIC BLOOD PRESSURE: 73 MMHG | HEART RATE: 61 BPM | OXYGEN SATURATION: 97 % | RESPIRATION RATE: 18 BRPM

## 2025-06-26 PROCEDURE — 82962 GLUCOSE BLOOD TEST: CPT

## 2025-06-26 PROCEDURE — 87389 HIV-1 AG W/HIV-1&-2 AB AG IA: CPT

## 2025-06-26 PROCEDURE — 59050 FETAL MONITOR W/REPORT: CPT

## 2025-06-26 PROCEDURE — 86901 BLOOD TYPING SEROLOGIC RH(D): CPT

## 2025-06-26 PROCEDURE — 85025 COMPLETE CBC W/AUTO DIFF WBC: CPT

## 2025-06-26 PROCEDURE — 86900 BLOOD TYPING SEROLOGIC ABO: CPT

## 2025-06-26 PROCEDURE — 86850 RBC ANTIBODY SCREEN: CPT

## 2025-06-26 PROCEDURE — 86780 TREPONEMA PALLIDUM: CPT

## 2025-06-26 RX ORDER — IBUPROFEN 200 MG
1 TABLET ORAL
Qty: 0 | Refills: 0 | DISCHARGE
Start: 2025-06-26

## 2025-06-26 RX ORDER — ACETAMINOPHEN 500 MG/5ML
3 LIQUID (ML) ORAL
Qty: 0 | Refills: 0 | DISCHARGE
Start: 2025-06-26

## 2025-06-26 RX ORDER — PRENATAL 136/IRON/FOLIC ACID 27 MG-1 MG
1 TABLET ORAL
Qty: 0 | Refills: 0 | DISCHARGE
Start: 2025-06-26

## 2025-06-26 RX ORDER — DIBUCAINE 10 MG/G
1 OINTMENT TOPICAL
Qty: 0 | Refills: 0 | DISCHARGE
Start: 2025-06-26

## 2025-06-26 RX ADMIN — Medication 600 MILLIGRAM(S): at 11:55

## 2025-06-26 RX ADMIN — Medication 600 MILLIGRAM(S): at 00:28

## 2025-06-26 RX ADMIN — Medication 600 MILLIGRAM(S): at 05:38

## 2025-06-26 RX ADMIN — Medication 975 MILLIGRAM(S): at 09:20

## 2025-06-26 RX ADMIN — Medication 1 TABLET(S): at 11:56

## 2025-06-26 RX ADMIN — Medication 975 MILLIGRAM(S): at 04:10

## 2025-06-26 RX ADMIN — Medication 975 MILLIGRAM(S): at 08:46

## 2025-06-26 RX ADMIN — Medication 975 MILLIGRAM(S): at 03:19

## 2025-06-26 RX ADMIN — Medication 600 MILLIGRAM(S): at 12:30

## 2025-06-26 RX ADMIN — Medication 600 MILLIGRAM(S): at 06:18

## 2025-06-26 NOTE — PROGRESS NOTE ADULT - SUBJECTIVE AND OBJECTIVE BOX
PPD#2- ATTENDING NOTE    S: Patient doing well. Minimal lochia. Pain controlled.    O: Vital Signs Last 24 Hrs  T(C): 36.7 (2025 05:12), Max: 36.8 (2025 09:00)  T(F): 98 (2025 05:12), Max: 98.3 (2025 09:00)  HR: 61 (2025 05:12) (61 - 79)  BP: 111/73 (2025 05:12) (111/73 - 129/89)  BP(mean): --  RR: 18 (2025 05:12) (18 - 18)  SpO2: 97% (2025 05:12) (97% - 98%)    Parameters below as of 2025 05:12  Patient On (Oxygen Delivery Method): room air        Gen: NAD  Abd: soft, NT, ND, fundus firm below umbilicus  Lochia: moderate  Ext: no tenderness, no hyper reflexia  Voiding well    Labs:    ABO Interpretation: O (25 @ 12:30)  Rh Interpretation: Positive (25 @ 12:30)   Antibody ScreenNegative                        12.4   11.98 )-----------( 180      ( 2025 12:37 )             38.3       A: 34y PPD# s/p  doing well.    Plan:  Analgesia prn  Regular diet  Discharge instruction given  F/U 6 weeks      Office [phone]  Dr. Gallardo    
OB Progress Note:  PPD#1    S: 35yo  PPD#1 s/p . Patient feels well. Pain is well controlled. She is tolerating a regular diet and passing flatus. She is voiding spontaneously, and ambulating without difficulty. Denies CP/SOB. Denies lightheadedness/dizziness. Denies N/V. Denies fever/chills.    O:  Vitals:  Vital Signs Last 24 Hrs  T(C): 37.3 (2025 00:35), Max: 37.3 (2025 00:35)  T(F): 99.2 (2025 00:35), Max: 99.2 (2025 00:35)  HR: 77 (2025 00:35) (33 - 115)  BP: 121/73 (2025 00:35) (88/49 - 135/81)  BP(mean): 89 (2025 00:35) (89 - 89)  RR: 18 (2025 00:35) (16 - 20)  SpO2: 97% (2025 00:35) (91% - 100%)    Parameters below as of 2025 00:35  Patient On (Oxygen Delivery Method): room air        MEDICATIONS  (STANDING):  acetaminophen     Tablet .. 975 milliGRAM(s) Oral <User Schedule>  diphtheria/tetanus/pertussis (acellular) Vaccine (Adacel) 0.5 milliLiter(s) IntraMuscular once  ibuprofen  Tablet. 600 milliGRAM(s) Oral every 6 hours  oxytocin Infusion 167 milliUNIT(s)/Min (167 mL/Hr) IV Continuous <Continuous>  oxytocin Infusion 167 milliUNIT(s)/Min (167 mL/Hr) IV Continuous <Continuous>  oxytocin Infusion. 4 milliUNIT(s)/Min (4 mL/Hr) IV Continuous <Continuous>  prenatal multivitamin 1 Tablet(s) Oral daily  sodium chloride 0.9% lock flush 3 milliLiter(s) IV Push every 8 hours      Labs:  Blood type: O Positive  Rubella IgG: RPR: Negative                          12.4   11.98[H] >-----------< 180    ( 06-24 @ 12:37 )             38.3                  Physical Exam:  General: NAD  Abdomen: soft, non-tender, non-distended, fundus firm  Vaginal: Lochia wnl  Extremities: No erythema/edema